# Patient Record
Sex: MALE | Race: BLACK OR AFRICAN AMERICAN | NOT HISPANIC OR LATINO | Employment: OTHER | URBAN - METROPOLITAN AREA
[De-identification: names, ages, dates, MRNs, and addresses within clinical notes are randomized per-mention and may not be internally consistent; named-entity substitution may affect disease eponyms.]

---

## 2020-01-03 PROBLEM — R06.02 SOB (SHORTNESS OF BREATH): Status: ACTIVE | Noted: 2020-01-03

## 2020-01-03 PROBLEM — N18.6 ESRD (END STAGE RENAL DISEASE): Status: ACTIVE | Noted: 2020-01-03

## 2020-01-03 PROBLEM — I10 HTN (HYPERTENSION): Status: ACTIVE | Noted: 2020-01-03

## 2020-01-03 PROBLEM — D50.8 IRON DEFICIENCY ANEMIA SECONDARY TO INADEQUATE DIETARY IRON INTAKE: Status: ACTIVE | Noted: 2020-01-03

## 2022-06-01 PROBLEM — R05.3 CHRONIC COUGH: Status: ACTIVE | Noted: 2022-06-01

## 2022-06-01 PROBLEM — R19.7 DIARRHEA: Status: ACTIVE | Noted: 2022-06-01

## 2022-06-01 PROBLEM — J96.11 CHRONIC RESPIRATORY FAILURE WITH HYPOXIA: Status: ACTIVE | Noted: 2022-06-01

## 2022-06-01 PROBLEM — R06.09 DYSPNEA ON EXERTION: Status: ACTIVE | Noted: 2020-01-03

## 2022-06-01 PROBLEM — R07.9 CHEST PAIN: Status: ACTIVE | Noted: 2022-06-01

## 2022-06-01 PROBLEM — D64.9 ANEMIA: Status: ACTIVE | Noted: 2022-06-01

## 2022-06-03 PROBLEM — E87.6 HYPOKALEMIA: Status: ACTIVE | Noted: 2022-06-03

## 2022-06-03 PROBLEM — R07.9 CHEST PAIN: Status: RESOLVED | Noted: 2022-06-01 | Resolved: 2022-06-03

## 2022-06-30 ENCOUNTER — TELEPHONE (OUTPATIENT)
Dept: FAMILY MEDICINE | Facility: CLINIC | Age: 67
End: 2022-06-30

## 2022-11-17 PROBLEM — J96.11 CHRONIC RESPIRATORY FAILURE WITH HYPOXIA: Status: RESOLVED | Noted: 2022-06-01 | Resolved: 2022-11-17

## 2022-11-17 PROBLEM — Z20.1 EXPOSURE TO TB: Status: ACTIVE | Noted: 2022-11-17

## 2022-11-17 PROBLEM — R19.7 DIARRHEA: Status: RESOLVED | Noted: 2022-06-01 | Resolved: 2022-11-17

## 2022-11-17 PROBLEM — R06.02 SHORTNESS OF BREATH: Status: RESOLVED | Noted: 2020-01-03 | Resolved: 2022-11-17

## 2023-03-06 ENCOUNTER — PES CALL (OUTPATIENT)
Dept: ADMINISTRATIVE | Facility: OTHER | Age: 68
End: 2023-03-06

## 2023-06-15 PROBLEM — I70.0 AORTIC ATHEROSCLEROSIS: Status: ACTIVE | Noted: 2023-06-15

## 2023-12-19 ENCOUNTER — PATIENT OUTREACH (OUTPATIENT)
Dept: ADMINISTRATIVE | Facility: CLINIC | Age: 68
End: 2023-12-19

## 2023-12-19 NOTE — PROGRESS NOTES
C3 nurse attempted to contact patient. The following occurred:   C3 nurse attempted to contact Colin Del Cid for a TCC post hospital discharge follow up call. The patient is unable to conduct the call @ this time. The patient requested a callback.    The patient does not have a scheduled HOSFU appointment within 5-7 days post hospital discharge date 12/15/23. Message sent to Physician staff to assist with HOSFU appointment scheduling.

## 2023-12-19 NOTE — PROGRESS NOTES
C3 nurse attempted to contact Colin Del Cid for a TCC post hospital discharge follow up call. No answer. Left voicemail with callback information. The patient does not have a scheduled HOSFU appointment. Message previously sent to PCP staff for assistance with scheduling visit with patient.

## 2023-12-20 NOTE — PROGRESS NOTES
C3 nurse attempted to contact patient. The following occurred:   C3 nurse attempted to contact Colin Del Cid for a TCC post hospital discharge follow up call. The patient is unable to conduct the call @ this time. The patient requested a callback.    The patient does not have a scheduled HOSFU appointment within 5-7 days post hospital discharge date 12/15/23. Message previously sent to Physician staff to assist with HOSFU appointment scheduling.

## 2024-05-09 PROBLEM — T82.858A: Status: RESOLVED | Noted: 2024-05-09 | Resolved: 2024-05-09

## 2024-05-09 PROBLEM — T82.858A: Status: ACTIVE | Noted: 2024-05-09

## 2024-05-18 PROBLEM — T82.898A PROBLEM WITH DIALYSIS ACCESS: Status: ACTIVE | Noted: 2024-05-18

## 2024-05-20 PROBLEM — K92.1 MELENA: Status: ACTIVE | Noted: 2024-05-20

## 2024-05-20 PROBLEM — B18.2 CHRONIC HEPATITIS C WITHOUT HEPATIC COMA: Status: ACTIVE | Noted: 2024-05-20

## 2024-05-20 PROBLEM — J44.9 COPD (CHRONIC OBSTRUCTIVE PULMONARY DISEASE): Status: ACTIVE | Noted: 2024-05-20

## 2024-05-20 PROBLEM — R19.5 DARK STOOLS: Status: ACTIVE | Noted: 2024-05-20

## 2024-05-21 PROBLEM — N18.6 ANEMIA IN ESRD (END-STAGE RENAL DISEASE): Status: ACTIVE | Noted: 2024-05-21

## 2024-05-21 PROBLEM — E55.9 VITAMIN D DEFICIENCY, UNSPECIFIED: Status: ACTIVE | Noted: 2024-05-21

## 2024-05-21 PROBLEM — N18.6 ESRD (END STAGE RENAL DISEASE) ON DIALYSIS: Status: ACTIVE | Noted: 2024-05-21

## 2024-05-21 PROBLEM — I10 ESSENTIAL HYPERTENSION: Status: ACTIVE | Noted: 2024-05-21

## 2024-05-21 PROBLEM — N25.81 SECONDARY HYPERPARATHYROIDISM OF RENAL ORIGIN: Status: ACTIVE | Noted: 2024-05-21

## 2024-05-21 PROBLEM — I35.0 SEVERE AORTIC STENOSIS: Status: ACTIVE | Noted: 2024-05-21

## 2024-05-21 PROBLEM — Z99.2 ESRD (END STAGE RENAL DISEASE) ON DIALYSIS: Status: ACTIVE | Noted: 2024-05-21

## 2024-05-21 PROBLEM — I35.0 AORTIC STENOSIS: Status: ACTIVE | Noted: 2024-05-21

## 2024-05-21 PROBLEM — D63.1 ANEMIA IN ESRD (END-STAGE RENAL DISEASE): Status: ACTIVE | Noted: 2024-05-21

## 2024-05-21 PROBLEM — R06.02 SOB (SHORTNESS OF BREATH): Status: ACTIVE | Noted: 2024-05-21

## 2024-05-21 PROBLEM — I50.9 HEART FAILURE: Status: ACTIVE | Noted: 2024-05-21

## 2024-05-22 PROBLEM — I50.22 HEART FAILURE WITH MILDLY REDUCED EJECTION FRACTION (HFMREF): Status: ACTIVE | Noted: 2024-05-21

## 2024-06-27 PROBLEM — Z87.891 PERSONAL HISTORY OF TOBACCO USE, PRESENTING HAZARDS TO HEALTH: Status: ACTIVE | Noted: 2024-06-27

## 2024-08-14 PROBLEM — I25.10: Status: ACTIVE | Noted: 2024-08-14

## 2024-08-15 ENCOUNTER — PATIENT OUTREACH (OUTPATIENT)
Dept: ADMINISTRATIVE | Facility: CLINIC | Age: 69
End: 2024-08-15

## 2024-08-15 PROBLEM — R07.9 CHEST PAIN: Status: ACTIVE | Noted: 2024-08-15

## 2024-08-15 PROBLEM — R60.9 EDEMA: Status: ACTIVE | Noted: 2024-08-15

## 2024-08-15 PROBLEM — J81.0 ACUTE PULMONARY EDEMA: Status: ACTIVE | Noted: 2024-08-15

## 2024-08-15 PROBLEM — I10 UNCONTROLLED HYPERTENSION: Status: ACTIVE | Noted: 2024-08-15
